# Patient Record
Sex: MALE
[De-identification: names, ages, dates, MRNs, and addresses within clinical notes are randomized per-mention and may not be internally consistent; named-entity substitution may affect disease eponyms.]

---

## 2019-01-14 ENCOUNTER — HOSPITAL ENCOUNTER (EMERGENCY)
Dept: HOSPITAL 97 - ER | Age: 24
Discharge: HOME | End: 2019-01-14
Payer: SELF-PAY

## 2019-01-14 DIAGNOSIS — M54.6: Primary | ICD-10-CM

## 2019-01-14 DIAGNOSIS — Z88.6: ICD-10-CM

## 2019-01-14 PROCEDURE — 99282 EMERGENCY DEPT VISIT SF MDM: CPT

## 2019-01-14 PROCEDURE — 71045 X-RAY EXAM CHEST 1 VIEW: CPT

## 2019-01-14 NOTE — RAD REPORT
EXAM DESCRIPTION:  Claudette Single View1/14/2019 3:57 pm

 

CLINICAL HISTORY:  Chest pain

 

COMPARISON:  February 2018

 

FINDINGS:   The lungs appear clear of acute infiltrate. The heart is normal size

 

IMPRESSION:   No acute abnormalities displayed

## 2019-01-14 NOTE — ER
Nurse's Notes                                                                                     

 [facility]                                                                

Name: Tom Weems Jr                                                                          

Age: 23 yrs                                                                                       

Sex: Male                                                                                         

: 1995                                                                                   

MRN: [id number]                                                                                   

Arrival Date: 2019                                                                          

Time: 14:22                                                                                       

Account#: [id number]                                                                            

Bed 11                                                                                            

Private MD: None, None                                                                            

Diagnosis: Thoracic back pain                                                                     

                                                                                                  

Presentation:                                                                                     

                                                                                             

14:45 Presenting complaint: Patient states: i have shoulder pain that started this morning; i hj  

      carry heavy things yesterday; reports elevated BP;. Transition of care: patient was not     

      received from another setting of care. Onset of symptoms was 2019. Risk         

      Assessment: Do you want to hurt yourself or someone else? Patient reports no desire to      

      harm self or others. Initial Sepsis Screen: Does the patient meet any 2 criteria? No.       

      Patient's initial sepsis screen is negative. Does the patient have a suspected source       

      of infection? No. Patient's initial sepsis screen is negative. Care prior to arrival:       

      None.                                                                                       

14:45 Method Of Arrival: Ambulatory                                                             

14:45 Acuity: DAPHNEY 4                                                                           hj  

                                                                                                  

Triage Assessment:                                                                                

14:47 General: Appears in no apparent distress. uncomfortable, Behavior is calm, cooperative, hj  

      appropriate for age. Pain: Complains of pain in shoulder. Musculoskeletal: Circulation,     

      motion, and sensation intact.                                                               

                                                                                                  

Historical:                                                                                       

- Allergies:                                                                                      

14:47 Tylenol;                                                                                hj  

- Home Meds:                                                                                      

14:47 None [Active];                                                                          hj  

- PMHx:                                                                                           

14:47 [place];                                                                                    hj  

- PSHx:                                                                                           

14:47 ear SX;                                                                                 hj  

                                                                                                  

- Immunization history:: Adult Immunizations up to date.                                          

- Social history:: Smoking status: Patient/guardian denies using tobacco,                         

  Patient/guardian denies using alcohol.                                                          

- Ebola Screening: : Patient negative for fever greater than or equal to 101.5 degrees            

  Fahrenheit, and additional compatible Ebola Virus Disease symptoms Patient denies               

  exposure to infectious person Patient denies travel to an Ebola-affected area in the            

  21 days before illness onset.                                                                   

                                                                                                  

                                                                                                  

Screenin:47 Abuse screen: Denies threats or abuse. Denies injuries from another. Nutritional        hj  

      screening: No deficits noted. Tuberculosis screening: No symptoms or risk factors           

      identified. Fall Risk None identified.                                                      

                                                                                                  

Vital Signs:                                                                                      

14:47  / 89; Pulse 84; Resp 18; Temp 97.4(TE); Pulse Ox 100% on R/A; Weight 61.23 kg;   hj  

      Height 5 ft. 6 in. (167.64 cm); Pain 9/10;                                                  

14:47 Body Mass Index 21.79 (61.23 kg, 167.64 cm)                                               

                                                                                                  

ED Course:                                                                                        

14:22 Patient arrived in ED.                                                                  mr  

14:22 None, None is Private Physician.                                                        mr  

14:46 Triage completed.                                                                       hj  

14:47 Arm band placed on left wrist.                                                          hj  

14:47 Patient has correct armband on for positive identification. Placed in gown. Bed in low  hj  

      position. Call light in reach. Side rails up X 1.                                           

15:04 Marla Hawley, RN is Primary Nurse.                                                     [email or web address]  

15:07 Kike Coleman MD is Attending Physician.                                             ps1 

15:58 CXR XRAY In Process Unspecified.                                                        EDMS

                                                                                                  

Administered Medications:                                                                         

No medications were administered                                                                  

                                                                                                  

                                                                                                  

Outcome:                                                                                          

16:46 Discharge ordered by MD.                                                                ps1 

16:51 Patient left the ED.                                                                      

                                                                                                  

Signatures:                                                                                       

Dispatcher [employer]                           EDMS                                                 

Marla Hawley RN RN aj RiveraMisty                                 mr                                                   

AnuelLuis Alberto mcdonough RN RN hj Baxter, Heather, RN RN                                                      

Kike Coleman MD MD   ps1                                                  

                                                                                                  

**************************************************************************************************

## 2019-01-14 NOTE — EDPHYS
Physician Documentation                                                                           

 [facility]                                                                

Name: Tom Weems Jr                                                                          

Age: 23 yrs                                                                                       

Sex: Male                                                                                         

: 1995                                                                                   

MRN: [id number]                                                                                   

Arrival Date: 2019                                                                          

Time: 14:22                                                                                       

Account#: [id number]                                                                            

Bed 11                                                                                            

Private MD: None, None                                                                            

ED Physician Kike Coleman                                                                      

HPI:                                                                                              

                                                                                             

16:41 This 23 yrs old  Male presents to ER via Ambulatory with complaints of Back     ps1 

      Pain, High Blood Pressure.                                                                  

16:41 patient states that he had back pain that started this morning. He lifts sandbags and   ps1 

      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 Pain rated as moderate and localized        

      between the scapula. Additionally he was sent in for evaluation of his blood pressure. .    

                                                                                                  

Historical:                                                                                       

- Allergies:                                                                                      

14:47 Tylenol;                                                                                hj  

- Home Meds:                                                                                      

14:47 None [Active];                                                                          hj  

- PMHx:                                                                                           

14:47 [place];                                                                                    hj  

- PSHx:                                                                                           

14:47 ear SX;                                                                                 hj  

                                                                                                  

- Immunization history:: Adult Immunizations up to date.                                          

- Social history:: Smoking status: Patient/guardian denies using tobacco,                         

  Patient/guardian denies using alcohol.                                                          

- Ebola Screening: : Patient negative for fever greater than or equal to 101.5 degrees            

  Fahrenheit, and additional compatible Ebola Virus Disease symptoms Patient denies               

  exposure to infectious person Patient denies travel to an Ebola-affected area in the            

  21 days before illness onset.                                                                   

                                                                                                  

                                                                                                  

ROS:                                                                                              

16:43 Constitutional: Negative for fever, chills, and weight loss, Eyes: Negative for injury, ps1 

      pain, redness, and discharge, Cardiovascular: Negative for chest pain, palpitations,        

      and edema, Respiratory: Negative for shortness of breath, cough, wheezing, and              

      pleuritic chest pain, Abdomen/GI: Negative for abdominal pain, nausea, vomiting,            

      diarrhea, and constipation, Skin: Negative for injury, rash, and discoloration, Neuro:      

      Negative for headache, weakness, numbness, tingling, and seizure.                           

16:43 MS/extremity: Positive for pain.                                                            

                                                                                                  

Exam:                                                                                             

16:43 Constitutional:  This is a well developed, well nourished patient who is awake, alert,  ps1 

      and in no acute distress. Head/Face:  Normocephalic, atraumatic. Chest/axilla:  Normal      

      chest wall appearance and motion.  Nontender with no deformity.  No lesions are             

      appreciated. Cardiovascular:  Regular rate and rhythm.  No gallops, murmurs, or rubs.       

      Normal PMI, no JVD.  No pulse deficits. Respiratory:  Lungs have equal breath sounds        

      bilaterally, clear to auscultation and percussion.  No rales, rhonchi or wheezes noted.     

       No increased work of breathing, no retractions or nasal flaring. Abdomen/GI:  Soft,        

      non-tender, with normal bowel sounds.  No distension or tympany.  No guarding or            

      rebound.  No evidence of tenderness throughout. Skin:  Warm, dry with normal turgor.        

      Normal color with no rashes, no lesions, and no evidence of cellulitis. MS/ Extremity:      

      Pulses equal, no cyanosis.  Neurovascular intact.  Full, normal range of motion.            

16:43 Back: patient examined in prone and supine position. Styles screen performed. Tissue        

      texture changes localized to ST, Lumbar, and upper thoracic in compensatory pattern.        

      HVLA performed and patients symptoms improved completely. Normal neurologic exam after.     

      .                                                                                           

                                                                                                  

Vital Signs:                                                                                      

14:47  / 89; Pulse 84; Resp 18; Temp 97.4(TE); Pulse Ox 100% on R/A; Weight 61.23 kg;   hj  

      Height 5 ft. 6 in. (167.64 cm); Pain 9/10;                                                  

14:47 Body Mass Index 21.79 (61.23 kg, 167.64 cm)                                               

                                                                                                  

MDM:                                                                                              

15:07 Patient medically screened.                                                             ps1 

                                                                                                  

                                                                                             

15:19 Order name: CXR XRAY                                                                    ps1 

                                                                                                  

Administered Medications:                                                                         

No medications were administered                                                                  

                                                                                                  

                                                                                                  

Disposition:                                                                                      

19 16:46 Discharged to Home. Impression: Thoracic back pain.                                

- Condition is Stable.                                                                            

- Discharge Instructions: Back Pain, Adult.                                                       

- Prescriptions for Anaprox  mg Oral Tablet - take 1 tablet by ORAL route every             

  12 hours As needed; 20 tablet.                                                                  

- Medication Reconciliation Form, Thank You Letter, Antibiotic Education, Prescription            

  Opioid Use form.                                                                                

- Follow up: Emergency Department; When: As needed; Reason: Worsening of condition.               

  Follow up: Private Physician; When: As needed; Reason: If symptoms return, Further              

  diagnostic work-up, Recheck today's complaints, Continuance of care.                            

- Problem is new.                                                                                 

- Symptoms have improved.                                                                         

                                                                                                  

                                                                                                  

                                                                                                  

Signatures:                                                                                       

Dispatcher [employer]                           EDMS                                                 

Luis Alberto Agee RN RN                                                      

Scarlett Ferraro RN RN                                                      

Kike Coleman MD MD   ps1                                                  

                                                                                                  

Corrections: (The following items were deleted from the chart)                                    

16:51 16:46 2019 16:46 Discharged to Home. Impression: Thoracic back pain. Condition is hb  

      Stable. Forms are Medication Reconciliation Form, Thank You Letter, Antibiotic              

      Education, Prescription Opioid Use. Follow up: Emergency Department; When: As needed;       

      Reason: Worsening of condition. Follow up: Private Physician; When: As needed; Reason:      

      If symptoms return, Further diagnostic work-up, Recheck today's complaints, Continuance     

      of care. Problem is new. Symptoms have improved. ps1                                        

                                                                                                  

**************************************************************************************************

## 2021-06-26 ENCOUNTER — HOSPITAL ENCOUNTER (EMERGENCY)
Dept: HOSPITAL 97 - ER | Age: 26
Discharge: HOME | End: 2021-06-26
Payer: SELF-PAY

## 2021-06-26 VITALS — TEMPERATURE: 97.4 F | OXYGEN SATURATION: 100 %

## 2021-06-26 VITALS — SYSTOLIC BLOOD PRESSURE: 137 MMHG | DIASTOLIC BLOOD PRESSURE: 96 MMHG

## 2021-06-26 DIAGNOSIS — R00.2: Primary | ICD-10-CM

## 2021-06-26 DIAGNOSIS — Z88.5: ICD-10-CM

## 2021-06-26 LAB
ALBUMIN SERPL BCP-MCNC: 4.3 G/DL (ref 3.4–5)
ALP SERPL-CCNC: 119 U/L (ref 45–117)
ALT SERPL W P-5'-P-CCNC: 30 U/L (ref 12–78)
AST SERPL W P-5'-P-CCNC: 14 U/L (ref 15–37)
BUN BLD-MCNC: 13 MG/DL (ref 7–18)
GLUCOSE SERPLBLD-MCNC: 95 MG/DL (ref 74–106)
HCT VFR BLD CALC: 44.8 % (ref 39.6–49)
INR BLD: 0.99
LYMPHOCYTES # SPEC AUTO: 1.6 K/UL (ref 0.7–4.9)
MAGNESIUM SERPL-MCNC: 2.4 MG/DL (ref 1.8–2.4)
METHAMPHET UR QL SCN: NEGATIVE
PMV BLD: 8.5 FL (ref 7.6–11.3)
POTASSIUM SERPL-SCNC: 3.9 MMOL/L (ref 3.5–5.1)
RBC # BLD: 4.99 M/UL (ref 4.33–5.43)
THC SERPL-MCNC: NEGATIVE NG/ML
TROPONIN (EMERG DEPT USE ONLY): < 0.02 NG/ML (ref 0–0.04)

## 2021-06-26 PROCEDURE — 80048 BASIC METABOLIC PNL TOTAL CA: CPT

## 2021-06-26 PROCEDURE — 93005 ELECTROCARDIOGRAM TRACING: CPT

## 2021-06-26 PROCEDURE — 84484 ASSAY OF TROPONIN QUANT: CPT

## 2021-06-26 PROCEDURE — 83735 ASSAY OF MAGNESIUM: CPT

## 2021-06-26 PROCEDURE — 99285 EMERGENCY DEPT VISIT HI MDM: CPT

## 2021-06-26 PROCEDURE — 80307 DRUG TEST PRSMV CHEM ANLYZR: CPT

## 2021-06-26 PROCEDURE — 85025 COMPLETE CBC W/AUTO DIFF WBC: CPT

## 2021-06-26 PROCEDURE — 36415 COLL VENOUS BLD VENIPUNCTURE: CPT

## 2021-06-26 PROCEDURE — 80076 HEPATIC FUNCTION PANEL: CPT

## 2021-06-26 PROCEDURE — 85610 PROTHROMBIN TIME: CPT

## 2021-06-26 PROCEDURE — 81003 URINALYSIS AUTO W/O SCOPE: CPT

## 2021-06-26 PROCEDURE — 85379 FIBRIN DEGRADATION QUANT: CPT

## 2021-06-26 PROCEDURE — 71045 X-RAY EXAM CHEST 1 VIEW: CPT

## 2021-06-26 NOTE — ER
Nurse's Notes                                                                                     

 [facility]                                                                 

Name: Tom Weems Jr                                                                          

Age: 26 yrs                                                                                       

Sex: Male                                                                                         

: 1995                                                                                   

MRN: [id number]                                                                                   

Arrival Date: 2021                                                                          

Time: 09:51                                                                                       

Account#: [id number]                                                                            

Bed 16                                                                                            

Private MD:                                                                                       

Diagnosis: Palpitations                                                                           

                                                                                                  

Presentation:                                                                                     

                                                                                             

10:00 Chief complaint: Patient states: drank an energy drink this morning and he doesn't      iw  

      normally drink them, did not eat anything, felt like his heart was beating fast, his        

      work made him leave because they saw him holding his chest , he now needs a note to got     

      back to work, pt denies cheat pain or palpitations at this time. Coronavirus screen: At     

      this time, the client does not indicate any symptoms associated with coronavirus-19.        

      Ebola Screen: Patient negative for fever greater than or equal to 101.5 degrees             

      Fahrenheit, and additional compatible Ebola Virus Disease symptoms Patient denies           

      exposure to infectious person. Patient denies travel to an Ebola-affected area in the       

      21 days before illness onset. No symptoms or risks identified at this time. Initial         

      Sepsis Screen: Does the patient meet any 2 criteria? No. Patient's initial sepsis           

      screen is negative. Does the patient have a suspected source of infection? No.              

      Patient's initial sepsis screen is negative. Risk Assessment: Do you want to hurt           

      yourself or someone else? Patient reports no desire to harm self or others. Onset of        

      symptoms was 2021.                                                                 

10:00 Method Of Arrival: Ambulatory                                                           iw  

10:00 Acuity: DAPHNEY 4                                                                           iw  

10:43 Acuity: DAPHNYE 3                                                                           iw  

                                                                                                  

Historical:                                                                                       

- Allergies:                                                                                      

10:03 Tylenol-Codeine;                                                                        iw  

- Home Meds:                                                                                      

10:03 None [Active];                                                                          iw  

- PMHx:                                                                                           

10:03 None;                                                                                   iw  

- PSHx:                                                                                           

10:03 left ear;                                                                               iw  

                                                                                                  

- Immunization history:: Client reports having NOT received the Covid vaccine.                    

- Social history:: Smoking status: Patient denies any tobacco usage or history of.                

                                                                                                  

                                                                                                  

Screening:                                                                                        

10:10 Abuse screen: Denies threats or abuse. Denies injuries from another. Nutritional        ca1 

      screening: No deficits noted. Tuberculosis screening: No symptoms or risk factors           

      identified. Fall Risk IV access (20 points).                                                

                                                                                                  

Assessment:                                                                                       

10:10 General: Appears in no apparent distress. comfortable, Behavior is calm, cooperative,   ca1 

      appropriate for age. Pain: Denies pain. Pain does not radiate. Pain began. Neuro: Level     

      of Consciousness is awake, alert, obeys commands, Oriented to person, place, time,          

      situation. Cardiovascular: Heart tones S1 S2 present Capillary refill < 3 seconds           

      Patient's skin is warm and dry. Rhythm is sinus rhythm. Respiratory: Airway is patent       

      Respiratory effort is even, unlabored, Respiratory pattern is regular, symmetrical,         

      Breath sounds are clear bilaterally. GI: Abdomen is flat, non-distended, Bowel sounds       

      present X 4 quads. Abd is soft and non tender X 4 quads. : No signs and/or symptoms       

      were reported regarding the genitourinary system. EENT: No signs and/or symptoms were       

      reported regarding the EENT system. Derm: Skin is intact, is healthy with good turgor,      

      Skin is pink, warm \T\ dry. Musculoskeletal: Circulation, motion, and sensation intact.     

      Capillary refill < 3 seconds.                                                               

11:00 Reassessment: Patient appears in no apparent distress at this time. Patient and/or      ca1 

      family updated on plan of care and expected duration. Pain level reassessed. Patient is     

      alert, oriented x 3, equal unlabored respirations, skin warm/dry/pink.                      

11:57 Reassessment: Patient appears in no apparent distress at this time. Patient and/or      ca1 

      family updated on plan of care and expected duration. Pain level reassessed. Patient is     

      alert, oriented x 3, equal unlabored respirations, skin warm/dry/pink.                      

12:54 Reassessment: Patient appears in no apparent distress at this time. Reassessment:       ca1 

      Patient appears in no apparent distress at this time. Patient and/or family updated on      

      plan of care and expected duration. Pain level reassessed. Patient is alert, oriented x     

      3, equal unlabored respirations, skin warm/dry/pink.                                        

                                                                                                  

Vital Signs:                                                                                      

10:00  / 92; Pulse 78; Resp 16; Temp 97.4; Pulse Ox 100% on R/A; Weight 72.57 kg;       iw  

      Height 5 ft. 6 in. (167.64 cm); Pain 0/10;                                                  

11:00  / 97; Pulse 81; Resp 16 S; Pulse Ox 100% on R/A;                                 ca1 

12:05  / 89; Pulse 76; Resp 16 S; Pulse Ox 100% on R/A;                                 ca1 

12:54  / 96; Pulse 68; Resp 18 S; Pulse Ox 100% on R/A;                                 ca1 

10:00 Body Mass Index 25.82 (72.57 kg, 167.64 cm)                                             iw  

                                                                                                  

ED Course:                                                                                        

09:51 Patient arrived in ED.                                                                  as  

10:02 Triage completed.                                                                       iw  

10:03 Arm band placed on.                                                                     iw  

10:05 Aleksey Astorga PA is PHCP.                                                                cp  

10:05 Aleksey Ruvalcaba MD is Attending Physician.                                             cp  

10:08 Elizabeth Huerta, RN is Primary Nurse.                                                      ca1 

10:10 Patient has correct armband on for positive identification. Placed in gown. Bed in low  ca1 

      position. Call light in reach. Side rails up X2. Cardiac monitor on. Pulse ox on. NIBP      

      on. Warm blanket given.                                                                     

10:38 XRAY Chest (1 view) In Process Unspecified.                                             EDMS

11:03 No provider procedures requiring assistance completed. Initial lab(s) drawn, by me,     ca1 

      sent to lab. Inserted saline lock: 20 gauge in left antecubital area, using aseptic         

      technique. Blood collected. Patient maintains SpO2 saturation greater than 95% on room      

      air.                                                                                        

12:24 Don Wong MD is Referral Physician.                                               cp  

12:55 IV discontinued, intact, bleeding controlled, No redness/swelling at site. Pressure     ca1 

      dressing applied.                                                                           

                                                                                                  

Administered Medications:                                                                         

No medications were administered                                                                  

                                                                                                  

                                                                                                  

Outcome:                                                                                          

12:25 Discharge ordered by MD.                                                                cp  

12:55 Discharged to home ambulatory.                                                          ca1 

12:55 Condition: stable                                                                           

12:55 Discharge instructions given to patient, Instructed on discharge instructions, follow       

      up and referral plans. Demonstrated understanding of instructions, follow-up care.          

12:55 Patient left the ED.                                                                    ca1 

                                                                                                  

Signatures:                                                                                       

Dispatcher [employer]                           EDMS                                                 

Maricruz Hickey                             as                                                   

Kely Ayala, RN                     RN   iw                                                   

Aleksey Astorga PA PA   cp                                                   

Elizabeth Huerta, OCTAVIO                        RN   ca1                                                  

                                                                                                  

Corrections: (The following items were deleted from the chart)                                    

10:04 10:00  / ???; Pulse 78bpm; Resp 16bpm; Pulse Ox 100% RA; Temp 97.4F; 72.57 kg;    iw  

      Height 5 ft. 6 in.; BMI: 25.8; Pain 0/10; iw                                                

                                                                                                  

**************************************************************************************************

## 2021-06-26 NOTE — EDPHYS
Physician Documentation                                                                           

 [facility]                                                                 

Name: Tom Weems Jr                                                                          

Age: 26 yrs                                                                                       

Sex: Male                                                                                         

: 1995                                                                                   

MRN: [id number]                                                                                   

Arrival Date: 2021                                                                          

Time: 09:51                                                                                       

Account#: [id number]                                                                            

Bed 16                                                                                            

Private MD:                                                                                       

ED Physician Aleksey Ruvalcaba                                                                      

HPI:                                                                                              

                                                                                             

10:20 This 26 yrs old  Male presents to ER via Ambulatory with complaints of          cp  

      Irregular Pulse.                                                                            

10:20 The patient presents with a history of heart racing. Context: The symptoms occur with   cp  

      light activity, while at work today.                                                        

10:20 Onset: The symptoms/episode began/occurred today. Duration: The patient or guardian     cp  

      reports a single episode, that is now resolved. Patient reports he was at work today        

      when he felt like his heart started "racing", so he put his hand across his chest and       

      was told by his employer to go to ED to get checked out. Patient admits to consuming        

      energy drink this morning prior to symptoms. Patient denies any chest pain with episode.    

                                                                                                  

Historical:                                                                                       

- Allergies:                                                                                      

10:03 Tylenol-Codeine;                                                                        iw  

- Home Meds:                                                                                      

10:03 None [Active];                                                                          iw  

- PMHx:                                                                                           

10:03 None;                                                                                   iw  

- PSHx:                                                                                           

10:03 left ear;                                                                               iw  

                                                                                                  

- Immunization history:: Client reports having NOT received the Covid vaccine.                    

- Social history:: Smoking status: Patient denies any tobacco usage or history of.                

                                                                                                  

                                                                                                  

ROS:                                                                                              

10:20 Constitutional: Negative for body aches, chills, fever, poor PO intake.                 cp  

10:20 Cardiovascular: Positive for palpitations, Negative for chest pain, edema.              cp  

10:20 Respiratory: Negative for cough, shortness of breath, wheezing.                             

10:20 Neck: Negative for pain with movement, pain at rest, stiffness.                         cp  

10:20 Abdomen/GI: Negative for abdominal pain, nausea, vomiting, and diarrhea.                    

10:20 Back: Negative for radiated pain.                                                           

10:20 Neuro: Negative for altered mental status, dizziness, headache, syncope, weakness.          

10:20 All other systems are negative.                                                         cp  

                                                                                                  

Exam:                                                                                             

10:17 ECG was reviewed by the Attending Physician.                                            cp  

10:25 Constitutional: The patient appears in no acute distress, alert, awake, comfortable,    cp  

      non-diaphoretic, non-toxic, well developed, well nourished.                                 

10:25 Head/Face:  Normocephalic, atraumatic.                                                  cp  

10:25 Eyes: Periorbital structures: appear normal, Conjunctiva: normal, no exudate, no            

      injection, Sclera: no appreciated abnormality, Lids and lashes: appear normal,              

      bilaterally.                                                                                

10:25 ENT: External ear(s): are unremarkable, Nose: is normal, Mouth: Lips: moist, Oral           

      mucosa: moist, Posterior pharynx: Airway: no evidence of obstruction, patent.               

10:25 Neck: ROM/movement: is normal, is supple, without pain, no range of motions limitations.    

10:25 Chest/axilla: Inspection: normal, Palpation: is normal, no crepitus, no tenderness.         

10:25 Cardiovascular: Rate: normal, Rhythm: regular, Heart sounds: murmur, not appreciated,       

      Edema: is not appreciated, JVD: is not appreciated.                                         

10:25 Respiratory: the patient does not display signs of respiratory distress,  Respirations:     

      normal, no use of accessory muscles, no retractions, labored breathing, is not present,     

      Breath sounds: are clear throughout, no decreased breath sounds, no stridor, no             

      wheezing.                                                                                   

10:25 Abdomen/GI: Inspection: abdomen appears normal, Palpation: abdomen is soft and              

      non-tender, in all quadrants.                                                               

10:25 Back: pain, is absent, ROM is normal.                                                       

10:25 Neuro: Orientation: to person, place \T\ time. Mentation: is normal, Cerebellar function:   

      is grossly normal, Motor: moves all fours, strength is normal, Sensation: is normal.        

                                                                                                  

Vital Signs:                                                                                      

10:00  / 92; Pulse 78; Resp 16; Temp 97.4; Pulse Ox 100% on R/A; Weight 72.57 kg;       iw  

      Height 5 ft. 6 in. (167.64 cm); Pain 0/10;                                                  

11:00  / 97; Pulse 81; Resp 16 S; Pulse Ox 100% on R/A;                                 ca1 

12:05  / 89; Pulse 76; Resp 16 S; Pulse Ox 100% on R/A;                                 ca1 

12:54  / 96; Pulse 68; Resp 18 S; Pulse Ox 100% on R/A;                                 ca1 

10:00 Body Mass Index 25.82 (72.57 kg, 167.64 cm)                                             iw  

                                                                                                  

MDM:                                                                                              

10:10 Patient medically screened.                                                             sarah 

10:30 Differential diagnosis: arrythmia, dehydration, stress disorder, electrolyte            cp  

      abnormality.                                                                                

12:25 Data reviewed: vital signs, nurses notes, lab test result(s), EKG, radiologic studies,  cp  

      plain films.                                                                                

12:25 Test interpretation: by ED physician or midlevel provider: ECG, plain radiologic        cp  

      studies. Counseling: I had a detailed discussion with the patient and/or guardian           

      regarding: the historical points, exam findings, and any diagnostic results supporting      

      the discharge/admit diagnosis, lab results, radiology results, to return to the             

      emergency department if symptoms worsen or persist or if there are any questions or         

      concerns that arise at home. Response to treatment: the patient's symptoms have             

      resolved after treatment, and as a result, I will discharge patient.                        

                                                                                                  

                                                                                             

10:18 Order name: Basic Metabolic Panel; Complete Time: 11:42                                 cp  

                                                                                             

10:18 Order name: CBC with Diff; Complete Time: 11:42                                         cp  

                                                                                             

11:42 Interpretation: Normal except: MCV 89.8.                                                cp  

                                                                                             

10:18 Order name: LFT's; Complete Time: 11:42                                                 cp  

                                                                                             

11:43 Interpretation: Normal except: AST 14; ; TP 8.8; GLOB 4.5; A/G 1.0.              cp  

                                                                                             

10:18 Order name: Magnesium; Complete Time: 11:42                                             cp  

                                                                                             

10:18 Order name: PT-INR; Complete Time: 11:42                                                cp  

                                                                                             

10:18 Order name: Troponin (emerg Dept Use Only); Complete Time: 11:42                        cp  

                                                                                             

10:18 Order name: XRAY Chest (1 view); Complete Time: 11:42                                   cp  

                                                                                             

10:18 Order name: EKG; Complete Time: 10:19                                                   cp  

                                                                                             

10:18 Order name: Cardiac monitoring; Complete Time: 11:05                                    cp  

                                                                                             

10:18 Order name: EKG - Nurse/Tech; Complete Time: 11:05                                      cp  

                                                                                             

10:18 Order name: IV Saline Lock; Complete Time: 11:05                                        cp  

                                                                                             

10:18 Order name: UDS; Complete Time: 12:24                                                   cp  

                                                                                             

10:18 Order name: D-Dimer; Complete Time: 11:42                                               cp  

                                                                                             

11:43 Interpretation: Within normal limits: D-DIMER < 215.                                    cp  

                                                                                             

12:04 Order name: Urine Dipstick-Ancillary; Complete Time: 12:24                              EDMS

                                                                                             

10:18 Order name: Labs collected and sent; Complete Time: 11:05                                 

                                                                                             

10:18 Order name: O2 Per Protocol; Complete Time: 11:06                                         

                                                                                             

10:18 Order name: O2 Sat Monitoring; Complete Time: 11:06                                       

                                                                                                  

ECG:                                                                                              

10:17 Rate is 76 beats/min. Rhythm is regular. DC interval is normal. QRS interval is normal. cp  

      QT interval is normal. T waves are Inverted in lead aVR. Interpreted by me. Reviewed by     

      me.                                                                                         

                                                                                                  

Administered Medications:                                                                         

No medications were administered                                                                  

                                                                                                  

                                                                                                  

Disposition:                                                                                      

13:00 Chart complete.                                                                           

21:08 Co-signature as Attending Physician, Aleksey Ruvalcaba MD I agree with the assessment and  [facility] 

      plan of care.                                                                               

                                                                                                  

Disposition:                                                                                      

21 12:25 Discharged to Home. Impression: Palpitations.                                      

- Condition is Stable.                                                                            

- Discharge Instructions: Palpitations.                                                           

                                                                                                  

- Medication Reconciliation Form, Thank You Letter, Antibiotic Education, Prescription            

  Opioid Use, Work release form form.                                                             

- Follow up: Don Wong MD; When: 2 - 3 days; Reason: Recheck today's complaints.            

- Problem is new.                                                                                 

- Symptoms are resolved.                                                                          

                                                                                                  

                                                                                                  

                                                                                                  

Signatures:                                                                                       

Dispatcher [employer]                           [facility]                                                 

Aleksey Ruvalcaba MD MD cha Williams, Irene, RN                     RN   Aleksey Knight PA PA                                                      

Elizabeth Huerta RN                        RN   ca1                                                  

                                                                                                  

Corrections: (The following items were deleted from the chart)                                    

12:55 12:25 2021 12:25 Discharged to Home. Impression: Palpitations. Condition is       ca1 

      Stable. Forms are Medication Reconciliation Form, Thank You Letter, Antibiotic              

      Education, Prescription Opioid Use. Follow up: Don Wong; When: 2 - 3 days; Reason:     

      Recheck today's complaints. Problem is new. Symptoms are resolved. cp                       

                                                                                                  

**************************************************************************************************

## 2021-06-26 NOTE — RAD REPORT
EXAM DESCRIPTION:  RAD - Chest Single View - 6/26/2021 10:38 am

 

CLINICAL HISTORY:  PALPITATIONS

 

COMPARISON:  January 2019

 

TECHNIQUE:  AP portable chest image was obtained 6/26/2021 10:38 am .

 

FINDINGS:  Lungs are clear. Heart and vasculature are normal. No measurable pleural effusion and no p
neumothorax. No acute bony abnormality seen. No acute aortic findings suspected.

 

IMPRESSION:  No acute cardiopulmonary process.

## 2021-06-27 NOTE — EKG
Test Date:    2021-06-26               Test Time:    10:11:33

Technician:   JOVON                                    

                                                     

MEASUREMENT RESULTS:                                       

Intervals:                                           

Rate:         76                                     

MA:           132                                    

QRSD:         86                                     

QT:           352                                    

QTc:          396                                    

Axis:                                                

P:            52                                     

MA:           132                                    

QRS:          48                                     

T:            53                                     

                                                     

INTERPRETIVE STATEMENTS:                                       

                                                     

Normal sinus rhythm with sinus arrhythmia

Normal ECG

Compared to ECG 02/28/2018 13:13:29

No significant changes



Electronically Signed On 06-27-21 08:43:25 CDT by Don Wong